# Patient Record
Sex: FEMALE | Race: WHITE | NOT HISPANIC OR LATINO | URBAN - METROPOLITAN AREA
[De-identification: names, ages, dates, MRNs, and addresses within clinical notes are randomized per-mention and may not be internally consistent; named-entity substitution may affect disease eponyms.]

---

## 2023-08-03 LAB — URINE CULTURE: ABNORMAL

## 2023-12-05 ENCOUNTER — DOCUMENTATION (OUTPATIENT)
Dept: GENETICS | Facility: CLINIC | Age: 53
End: 2023-12-05

## 2023-12-05 NOTE — PROGRESS NOTES
Cancer Genetics Chart Update (telephone results note):    Linda Rainey is a 53 y.o. female with a family history of early-onset breast cancer in her sister, as well as a family history of a known familial CHEK2 mutation. She recently underwent testing for the known familial mutation in CHEK2. 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 These results were reviewed with Ms. Rainey today by phone, and she will be mailed a copy of her results for her records.    Family history (as previously noted):  -Patient, no history of cancer;  -Sister, breast cancer at age 49, underwent 65-gene [employer] Cancer Screen and was found to have th CHEK2 c.470C>T (p.Tbv685Vuu) mutation, also known as I157T;  -Mother, no history of cancer, alive at 87;  -Maternal uncle #1, cancer NOS (kidney?) in his 90s, alive in his 90s;  -Maternal uncle #2, bladder cancer at an older age,  at 92;  -Maternal grandmother, gastric cancer,  at 43;  -Maternal great aunt (M's sister), skin cancer, ;  -Paternal 1st cousin, cancer NOS,  in her 60s;  -No other known family history of cancer.     Ms. Rainey is of [place] descent. There is no known Ashkenazi Amish ancestry or consanguinity.     Genetic test results:  Ms. Rainey underwent testing for the CHEK2 gene only. No other genes were examined. Results are summarized at the bottom of this note as well as attached to the encounter.    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 Rainey was found to be carrying the same CHEK2 mutation as her sister. We reviewed what we currently known about this gene, and the I157T gene mutation in particular.    Classification of the I157T CHEK2 mutation:  We discussed that this  particular CHEK2 mutation (c.470T>C (p.Grd462Hdl), aka I157T) is recognized by most laboratories as a pathogenic or likely pathogenic mutation, meaning it is a disease-causing mutation and leads to increased cancer risk. We reviewed that this mutation appears to be of low penetrance and/or moderate risk, meaning that the cancer risks are nowhere near as high as some other genes). Most individuals with this mutation will not develop cancer due to the gene mutation itself.    Some testing laboratories currently classify this particular CHEK2 mutation as a variant of uncertain significance (VUS) due to conflicting evidence regarding the pathogenicity of this specific CHEK2 variant. This lack of consensus between testing laboratories is based on both conflicting biochemical analyses of CHEK2 function as well as conflicting risk-association studies. Based on current guidelines, the National Comprehensive Cancer Network (NCCN) and American College of Medical Genetics and Genomics (ACMG) generally recommend that cancer screening for individuals with this particular CHEK2 mutation be based on the best estimate of cancer risk estimates for this particular gene mutation, in addition to family history.    Cancer risks associated with th I157T CHEK2 mutation:  Among the case-control studies showing evidence of moderate association with cancer risk, women with a heterozygous (single copy) CHEK2 I157T mutation were estimated to be about 1.5-fold increased risk for breast cancer over the general population, which translates into an approximately 18-19% lifetime risk. CHEK2 mutations in general have also been shown to increase the risk for a second primary breast cancer; however, there is presently no evidence that the CHEK2 I157T mutation specifically is associated with a significantly increased risk for additional primary breast malignancies.    Studies have also suggested that both men and women with the CHEK2 I157T mutation are  at increased risk for colorectal cancer. This risk has been estimated to be up to 2 times the general population (with cancer risks being closer to the 2-fold risk for those with a family history of colon cancer), corresponding to up to a 8-9% lifetime risk.     Other cancers have also been associated with CHEK2 mutations, but there is limited data. Prostate, thyroid, and male breast cancer risk have also been suggested; however, specific risk estimates for the CHEK2 I157T mutation have not been determined.     Current NCCN recommendations for care for I157T CHEK2 mutation carriers:  Current NCCN & ACMG recommendations for women who are CHEK2 I157T mutation carriers include age-related screening (yearly mammograms from age 40). Any change to breast cancer screening from population screening should be based on family history alone.    Current NCCN & ACMG recommendations for colorectal cancer screening for men and women who are CHEK2 I157T mutation carriers vary, but all note to take family history into account. In general, if there is a family history of colon cancer in a first-degree relative, the recommendation would be for colonoscopies every 5 years from age 40 (or 10 years prior to the age of the first-degree relative's diagnosis if diagnosed prior to age 50).    If there is no family history of colon cancer, the recommendations are less clear, in that the Colorectal NCCN guidelines still note that earlier and more frequent screening than average-risk initiation may be justified for I157T mutation carriers, but other guidelines do not. Even though the Breast/Ovarian/Pancreatic NCCN panel guidelines and ACMG disagree, the Colorectal NCCN panel guidelines recommend increased colonoscopy surveillance for CHEK2 mutation carriers. At this time, they do not specifically call out I157T as needing less screening than other gene mutations. They recommend colonoscopy every 5 years beginning at age 40 or 10 years prior to  a first-degree relative's age at colorectal cancer diagnosis. This is five years earlier than when average-age screening is recommended to begin (current age-related colorectal cancer screening is recommended to begin by age 45). NCCN note that shared decision-making between the patient and their provider may be helpful in determining how often colonoscopies should be performed.    Other cancers have also been associated with CHEK2 mutations, including prostate, thyroid, and male breast cancer. For this particular CHEK2 I157T mutation, we do not have any current screening recommendations for the other cancers at this time, beyond what is recommended for the general population.    As recommendations for CHEK2 mutation carriers will change over time, we asked that Ms. Rainey return to Genetics every 1-2 years so the current screening recommendations can be reviewed.    Recommendations for Ms. Rainey:  Per the above, at this time, we discussed most changes to Ms. Rainey care should be based on her personal and/or family history alone.    Given her family history of early-onset breast cancer in her sister, Ms. Rainey was encouraged to continue with follow-up in the High-Risk Breast Care program with PAMELA Mckeon. She is scheduled for a breast MRI in 1/2024, and mammogram and follow-up with Kaylynn in 7/2024.    Ms. Rainey had a negative Cologuard screening last year to screen for colorectal cancer. We reviewed that, in the absence of any family history of colorectal cancer, it is less clear if more frequent colonoscopies are needed. Most individuals have a colonoscopy once every 10 years, unless they are using another method of colorectal cancer screening, such as the Cologuard test. Ms. Rainey was encouraged to discuss colorectal cancer screening with her PCP Constantino Marr MD, and was asked to report any concerning GI symptoms to her PCP for prompt evaluation.    Ms. Rainey was also encouraged to follow  with her primary care provider(s) for any other age-related cancer screenings, such as Pap smears, etc.    Additional genetic testing:  Ms. Rainey was again offered the option of pursuing additional hereditary cancer testing given her family history, but has chosen to decline this. Should she change her mind, we would be glad to help coordinate testing.    Genetic testing & care for Ms. Rainey's family members:  Ms. Hidalgo test first-degree relatives, which include her mother, siblings, and children, each have a 50% chance of having the same CHEK2 I157T mutation she carries. Given the absence of any colorectal cancer in the family, testing other family members solely for the benefit of determining if they have this CHEK2 I157T mutation may be of little medical benefit. Her mother, sisters, daughter, and nieces are all still considered to be at increased risk for breast cancer based on the family history of breast cancer alone, and may wish to discuss screening options based on family history. Regarding her children, their father has no known family history of cancer on his side of the family, so no hereditary cancer testing is recommended based on his family history.    If Ms. Cunningham relatives are local, we would be glad to see them here at . They can call [phone], option 1, to schedule an appointment. If they live outside the [facility], her relatives can find a genetics specialist in their area by visiting the National Society for Genetic Counselors website at: <http://www.nsgc.org/> under “Find a Genetic Counselor,” with “Cancer” specified under special area.    Ana Ivory MS, [facility] [place]  Genetic Counselor  [facility]  [phone]    Reviewed by:  Holly De Oliveira MD  Clinical   [facility]  [phone]

## 2024-01-27 ENCOUNTER — HOSPITAL ENCOUNTER (OUTPATIENT)
Dept: RADIOLOGY | Facility: HOSPITAL | Age: 54
Discharge: HOME | End: 2024-01-27

## 2024-01-27 DIAGNOSIS — Z12.39 ENCOUNTER FOR OTHER SCREENING FOR MALIGNANT NEOPLASM OF BREAST: ICD-10-CM

## 2024-01-27 PROCEDURE — 2550000001 HC RX 255 CONTRASTS: Performed by: NURSE PRACTITIONER

## 2024-01-27 PROCEDURE — A9575 INJ GADOTERATE MEGLUMI 0.1ML: HCPCS | Performed by: NURSE PRACTITIONER

## 2024-01-27 PROCEDURE — 6100000003 BI MR BREAST BILATERAL WITH CONTRAST FAST SCREENING SELF PAY

## 2024-01-27 RX ORDER — GADOTERATE MEGLUMINE 376.9 MG/ML
15 INJECTION INTRAVENOUS
Status: COMPLETED | OUTPATIENT
Start: 2024-01-27 | End: 2024-01-27

## 2024-01-27 RX ADMIN — GADOTERATE MEGLUMINE 12 ML: 376.9 INJECTION INTRAVENOUS at 09:30

## 2024-01-31 ENCOUNTER — TELEPHONE (OUTPATIENT)
Dept: SURGICAL ONCOLOGY | Facility: HOSPITAL | Age: 54
End: 2024-01-31

## 2024-01-31 NOTE — TELEPHONE ENCOUNTER
Result Communication    Left voicemail for Linda Rainey regarding normal MRI results.  She is to call the office with any questions or concerns.       Resulted Orders   MR breast bilateral w IV contrast fast screening self pay    Narrative    Interpreted By:  Mary Buckner,   STUDY:  BI MR BREAST BILATERAL WITH CONTRAST FAST SCREENING SELF PAY;  1/27/2024 9:43 am      ACCESSION NUMBER(S):  [id number]      ORDERING CLINICIAN:  JAQUELINE OSORIO      INDICATION:  High-risk screening. Family history of breast cancer, dense breast  tissue on mammography. History of prior benign left core needle  biopsy.      COMPARISON:  Breast MRI 01/21/2023, 07/02/2022; MRI guided biopsy 07/20/2022      TECHNIQUE:  Using a dedicated breast coil, STIR axial and T1-weighted fat  saturation axial images of the breasts were obtained, the latter both  before and after intravenous administration of Gadolinium DTPA.      Intravenous contrast:  12 mL of Dotarem      FINDINGS:  Density:  The breast tissue is heterogeneously dense, which may  obscure small masses.      There is symmetric minimal bilateral background enhancement.      RIGHT BREAST:  No suspicious mass or nonmass enhancement is  identified.      No axillary or internal mammary lymphadenopathy is appreciated.      LEFT BREAST: There is an area of prior biopsy associated with the  tissue marker in central inferior left breast at middle depth without  associated suspicious enhancement. No suspicious mass or nonmass  enhancement is identified.      No axillary or internal mammary lymphadenopathy is appreciated.      NON-BREAST FINDINGS:  None.        Impression    No MRI evidence of malignancy in either breast.  Please note that the patient may be overdue for annual screening  mammography. Bilateral screening mammography is recommended at this  time if not performed within the last 12 months.      BI-RADS CATEGORY:      BI-RADS Category:  2 Benign.  Recommendation:  Routine  Screening Breast MRI in 1 Year.  Recommended Date:  1 Year.  Laterality:  Bilateral.      For any future breast imaging appointments, please call [phone] ([phone]).          MACRO:  None      Signed by: Mary Buckner 1/30/2024 4:57 PM  Dictation workstation:   [id number]       8:55 AM

## 2024-07-26 ENCOUNTER — APPOINTMENT (OUTPATIENT)
Dept: RADIOLOGY | Facility: HOSPITAL | Age: 54
End: 2024-07-26

## 2024-07-26 ENCOUNTER — APPOINTMENT (OUTPATIENT)
Dept: SURGICAL ONCOLOGY | Facility: HOSPITAL | Age: 54
End: 2024-07-26

## 2024-07-26 DIAGNOSIS — Z12.31 SCREENING MAMMOGRAM FOR BREAST CANCER: ICD-10-CM

## 2024-08-02 ENCOUNTER — HOSPITAL ENCOUNTER (OUTPATIENT)
Dept: RADIOLOGY | Facility: HOSPITAL | Age: 54
Discharge: HOME | End: 2024-08-02
Payer: COMMERCIAL

## 2024-08-02 ENCOUNTER — OFFICE VISIT (OUTPATIENT)
Dept: SURGICAL ONCOLOGY | Facility: HOSPITAL | Age: 54
End: 2024-08-02
Payer: COMMERCIAL

## 2024-08-02 VITALS
SYSTOLIC BLOOD PRESSURE: 130 MMHG | WEIGHT: 140 LBS | HEIGHT: 67 IN | HEART RATE: 74 BPM | DIASTOLIC BLOOD PRESSURE: 72 MMHG | RESPIRATION RATE: 16 BRPM | BODY MASS INDEX: 21.97 KG/M2

## 2024-08-02 DIAGNOSIS — Z12.31 SCREENING MAMMOGRAM FOR BREAST CANCER: ICD-10-CM

## 2024-08-02 DIAGNOSIS — Z15.89 CHEK2 POSITIVE: ICD-10-CM

## 2024-08-02 DIAGNOSIS — R92.30 DENSE BREAST TISSUE: ICD-10-CM

## 2024-08-02 DIAGNOSIS — Z12.39 BREAST CANCER SCREENING, HIGH RISK PATIENT: Primary | ICD-10-CM

## 2024-08-02 PROCEDURE — 3008F BODY MASS INDEX DOCD: CPT | Performed by: NURSE PRACTITIONER

## 2024-08-02 PROCEDURE — 1036F TOBACCO NON-USER: CPT | Performed by: NURSE PRACTITIONER

## 2024-08-02 PROCEDURE — 99214 OFFICE O/P EST MOD 30 MIN: CPT | Performed by: NURSE PRACTITIONER

## 2024-08-02 PROCEDURE — 77063 BREAST TOMOSYNTHESIS BI: CPT | Performed by: RADIOLOGY

## 2024-08-02 PROCEDURE — 77067 SCR MAMMO BI INCL CAD: CPT | Performed by: RADIOLOGY

## 2024-08-02 PROCEDURE — 77067 SCR MAMMO BI INCL CAD: CPT

## 2024-08-02 NOTE — PROGRESS NOTES
[facility]  Linda Rainey female   1970 54 y.o.   [id number]      Chief Complaint  Follow up annual mammogram and exam, high risk surveillance care, CHEK2 positive.     History Of Present Illness  Linda Rainey is a very pleasant 54 y.o.  female followed in the breast center for high risk surveillance care. She denies breast surgery but has a history of a benign left core biopsy. She was genetically tested for the CHEK2 mutation and was positive, no other genes were tested. She has a family history of breast cancer in her sister age 39 who is CHEK2 positive. She presents today for annual mammogram and exam.     BREAST IMAGIN2024 Bilateral screening mammogram, indicates BI-RADS Category 1.     REPRODUCTIVE HISTORY: menarche age 15, , first birth age 28,  x a few months, OCP's x 8 years, natural menopause age 50, no HRT, heterogeneously dense     FAMILY CANCER HISTORY:  Sister: Breast cancer, age 39 (Chek 2 positive)  Maternal Grandmother: Gastric cancer  Maternal Uncle: Bladder cancer     Review of Systems  Constitutional:  Negative for appetite change, fatigue, fever. Positive for weight gain 15 lbs.   HENT:  Negative for ear pain, hearing loss, nosebleeds, sore throat and trouble swallowing.    Eyes:  Negative for discharge, itching and visual disturbance.   Breast: As stated in HPI.  Respiratory:  Negative for cough, chest tightness and shortness of breath.    Cardiovascular:  Negative for chest pain, palpitations and leg swelling.   Gastrointestinal:  Negative for abdominal pain, constipation, diarrhea and nausea.   Endocrine: Negative for cold intolerance and heat intolerance.   Genitourinary:  Negative for dysuria, frequency, hematuria, pelvic pain and vaginal bleeding.   Musculoskeletal:  Negative for arthralgias, back pain, gait problem, joint swelling and myalgias.   Skin:  Negative for color change and rash.   Allergic/Immunologic: Negative for environmental  allergies and food allergies.   Neurological:  Negative for dizziness, tremors, speech difficulty, weakness, numbness and headaches.   Hematological:  Does not bruise/bleed easily.   Psychiatric/Behavioral:  Negative for agitation, dysphoric mood and sleep disturbance. The patient is not nervous/anxious.       Past Medical History  She has no past medical history on file.    Surgical History  She has no past surgical history on file.     Family History  Cancer-related family history is not on file.     Social History  She reports that she has never smoked. She has never used smokeless tobacco. No history on file for alcohol use and drug use.    Allergies  Patient has no known allergies.    Medications  No current outpatient medications    Last Recorded Vitals  Vitals:    08/02/24 0953   BP: 130/72   Pulse: 74   Resp: 16       Physical Exam  Patient is alert and oriented x3 and in a relaxed and appropriate mood. Her gait is steady and hand grasps are equal. Sclera is clear. The breasts are nearly symmetrical. The tissue is soft without palpable abnormalities, discrete nodules or masses. The skin and nipples appear normal. There is no cervical, supraclavicular or axillary lymphadenopathy. Heart rate and rhythm normal, S1 and S2 appreciated. The lungs are clear to auscultation bilaterally. Abdomen is soft and non-tender.        Relevant Results and Imaging  BI mammo bilateral screening tomosynthesis 08/02/2024    Narrative  Interpreted By:  Brooke Diaz,  STUDY:  BI MAMMO BILATERAL SCREENING TOMOSYNTHESIS;  8/2/2024 10:44 am    ACCESSION NUMBER(S):  [id number]    ORDERING CLINICIAN:  JAQUELINE OSORIO    INDICATION:  Screening.    COMPARISON:  None.    FINDINGS:  2D and tomosynthesis images were reviewed at 1 mm slice thickness.    Density:  The breast tissue is heterogeneously dense, which may  obscure small masses.    A tissue marker is noted in the inferomedial left breast from prior  benign biopsy. No  suspicious masses or calcifications are identified.    Impression  No mammographic evidence of malignancy.    BI-RADS CATEGORY:  BI-RADS Category:  1 Negative.  Recommendation:  Annual Screening.  Recommended Date:  1 Year.  Laterality:  Bilateral.        For any future breast imaging appointments, please call [phone]).      MACRO:  None    Signed by: Brooke Diaz 8/2/2024 10:50 AM  Dictation workstation:   [id number]      I explained the results in depth, along with suggested explanation for follow up recommendations based on the testing results. BI-RADS Category 1      Risk Models  Risk models indicate personal risk of breast cancer      Absolute risk of breast cancer is 20%-40%  ASK2ME Lifetime risk is 17%        Orders  Orders Placed This Encounter   Procedures    BI mammo bilateral screening tomosynthesis     Standing Status:   Future     Standing Expiration Date:   10/2/2025     Order Specific Question:   Is the patient pregnant?     Answer:   No     Order Specific Question:   Reason for exam:     Answer:   annual screening mammogram     Order Specific Question:   Radiologist to Determine Optimal Study     Answer:   Yes     Order Specific Question:   [place]     Answer:   Immediate     Order Specific Question:   Is this exam part of a Research Study? If Yes, link this order to the research study     Answer:   No    MR breast bilateral w IV contrast fast screening self pay     Standing Status:   Future     Standing Expiration Date:   8/2/2025     Order Specific Question:   Reason for exam:     Answer:   high risk surveillance care, dense breast tissue, lifetime risk > 20%, CHEK2 positive gene mutation     Order Specific Question:   Is the patient pregnant or breast feeding?     Answer:   No     Order Specific Question:   Does the patient have a Cochlear Implant, Pacemaker, Defibrillator, Pacing Wire, Brain Aneurysm Clip, Implanted Nerve or Bone Graft Stimulator, Implanted  Breast Tissue Expander, Glucose Monitor or Neulasta Device?     Answer:   No     Order Specific Question:   Radiologist to Determine Optimal Study     Answer:   Yes     Order Specific Question:   Release result to BPT     Answer:   Immediate     Order Specific Question:   Is this exam part of a Research Study? If Yes, link this order to the research study     Answer:   No       Visit Diagnosis  1. Breast cancer screening, high risk patient  BI mammo bilateral screening tomosynthesis    MR breast bilateral w IV contrast fast screening self pay      2. CHEK2 positive  MR breast bilateral w IV contrast fast screening self pay      3. Dense breast tissue  MR breast bilateral w IV contrast fast screening self pay          Assessment/Plan  High risk surveillance care, normal clinical exam and imaging, no breast surgery, hx benign left core biopsy, CHEK2 positive, family history breast cancer, heterogeneously dense    Plan:  Return August 2025 for bilateral screening mammogram and office visit. Proceed with fast MRI February 2025. Discussed endocrine therapy risks and side effects and she would like to hold off at this time.     Patient Discussion/Summary  Your clinical examination and imaging are normal. Please return in one year for mammogram and office visit or sooner if you have any problems or concerns.     High risk breast surveillance care plan:  Yearly mammogram with digital breast tomosynthesis  Twice yearly clinical breast examinations  Breast MRI (to schedule call [phone])  Monthly self breast examinations &/or regular self breast awareness  Vitamin D3 2000 IU/daily (over the counter) unless your PCP recommends you take a specific dose  Exercise 3-4 times per week for 45-60 minutes  Limit alcohol to 3-4 drinks per week if you are menopausal  Eat healthy low-fat diet with lots of vegetable & fruits  Risk model indicates you are eligible for endocrine therapy with Tamoxifen, Raloxifene or Aromatase  inhibitor. Endocrine therapy reduces lifetime risk of breast cancer by up to 50% when taken for 5 years. There is an alternative low dose Tamoxifen which can be taken for only 3 years.      IMPORTANT INFORMATION REGARDING YOUR RESULTS    You can see your health information, review clinical summaries from office visits & test results online when you follow your health with MY  Chart, a personal health record. To sign up go to [email or web address]. If you need assistance with signing up or trouble getting into your account call [facility] 24/7 at [phone].    My office phone number is [phone] if you need to get in touch with me or have additional questions or concerns. Thank you for choosing [facility] and trusting me as your healthcare provider. I look forward to seeing you again at your next office visit. I am honored to be a provider on your health care team and I remain dedicated to helping you achieve your health goals.    Kaylynn Fuentes, APRN-CNP

## 2024-08-03 ENCOUNTER — HOSPITAL ENCOUNTER (OUTPATIENT)
Dept: RADIOLOGY | Facility: EXTERNAL LOCATION | Age: 54
Discharge: HOME | End: 2024-08-03
Payer: COMMERCIAL

## 2025-02-03 ENCOUNTER — APPOINTMENT (OUTPATIENT)
Dept: RADIOLOGY | Facility: HOSPITAL | Age: 55
End: 2025-02-03

## 2025-02-11 ENCOUNTER — HOSPITAL ENCOUNTER (OUTPATIENT)
Dept: RADIOLOGY | Facility: HOSPITAL | Age: 55
Discharge: HOME | End: 2025-02-11

## 2025-02-11 DIAGNOSIS — R92.30 DENSE BREAST TISSUE: ICD-10-CM

## 2025-02-11 DIAGNOSIS — Z15.89 CHEK2 POSITIVE: ICD-10-CM

## 2025-02-11 DIAGNOSIS — Z12.39 BREAST CANCER SCREENING, HIGH RISK PATIENT: ICD-10-CM

## 2025-02-11 PROCEDURE — 2550000001 HC RX 255 CONTRASTS: Performed by: NURSE PRACTITIONER

## 2025-02-11 PROCEDURE — A9575 INJ GADOTERATE MEGLUMI 0.1ML: HCPCS | Performed by: NURSE PRACTITIONER

## 2025-02-11 PROCEDURE — 6100000003 BI MR BREAST BILATERAL WITH CONTRAST FAST SCREENING SELF PAY

## 2025-02-11 RX ORDER — GADOTERATE MEGLUMINE 376.9 MG/ML
15 INJECTION INTRAVENOUS
Status: COMPLETED | OUTPATIENT
Start: 2025-02-11 | End: 2025-02-11

## 2025-02-11 RX ADMIN — GADOTERATE MEGLUMINE 12.5 ML: 376.9 INJECTION INTRAVENOUS at 11:30

## 2025-06-16 LAB — NON-UH HIE GP HPV: NEGATIVE

## 2025-06-18 LAB — NON-UH HIE THINPREP TIS PAP: NORMAL

## 2025-08-08 ENCOUNTER — HOSPITAL ENCOUNTER (OUTPATIENT)
Dept: RADIOLOGY | Facility: HOSPITAL | Age: 55
Discharge: HOME | End: 2025-08-08
Payer: COMMERCIAL

## 2025-08-08 ENCOUNTER — APPOINTMENT (OUTPATIENT)
Dept: SURGICAL ONCOLOGY | Facility: HOSPITAL | Age: 55
End: 2025-08-08
Payer: COMMERCIAL

## 2025-08-08 VITALS — HEIGHT: 67 IN | BODY MASS INDEX: 20.4 KG/M2 | WEIGHT: 130 LBS

## 2025-08-08 DIAGNOSIS — Z12.39 BREAST CANCER SCREENING, HIGH RISK PATIENT: ICD-10-CM

## 2025-08-08 PROCEDURE — 77067 SCR MAMMO BI INCL CAD: CPT

## 2025-08-15 ENCOUNTER — APPOINTMENT (OUTPATIENT)
Dept: SURGICAL ONCOLOGY | Facility: HOSPITAL | Age: 55
End: 2025-08-15
Payer: COMMERCIAL